# Patient Record
Sex: FEMALE | Race: WHITE | Employment: FULL TIME | URBAN - METROPOLITAN AREA
[De-identification: names, ages, dates, MRNs, and addresses within clinical notes are randomized per-mention and may not be internally consistent; named-entity substitution may affect disease eponyms.]

---

## 2019-03-25 ENCOUNTER — APPOINTMENT (OUTPATIENT)
Dept: LAB | Facility: HOSPITAL | Age: 49
End: 2019-03-25
Attending: COLON & RECTAL SURGERY
Payer: COMMERCIAL

## 2019-03-25 ENCOUNTER — OFFICE VISIT (OUTPATIENT)
Dept: SURGERY | Facility: CLINIC | Age: 49
End: 2019-03-25
Payer: COMMERCIAL

## 2019-03-25 VITALS
HEIGHT: 63 IN | HEART RATE: 59 BPM | TEMPERATURE: 99 F | BODY MASS INDEX: 51.91 KG/M2 | WEIGHT: 293 LBS | SYSTOLIC BLOOD PRESSURE: 156 MMHG | DIASTOLIC BLOOD PRESSURE: 90 MMHG

## 2019-03-25 DIAGNOSIS — Z01.818 PRE-OP TESTING: ICD-10-CM

## 2019-03-25 DIAGNOSIS — K62.9 ANAL LESION: Primary | ICD-10-CM

## 2019-03-25 DIAGNOSIS — F41.9 ANXIETY: ICD-10-CM

## 2019-03-25 DIAGNOSIS — Z80.0 FAMILY HISTORY OF COLON CANCER IN MOTHER: ICD-10-CM

## 2019-03-25 PROBLEM — E03.9 HYPOTHYROIDISM: Status: ACTIVE | Noted: 2017-10-19

## 2019-03-25 PROBLEM — I10 HYPERTENSION: Status: ACTIVE | Noted: 2018-04-03

## 2019-03-25 LAB
ALBUMIN SERPL-MCNC: 3.5 G/DL (ref 3.4–5)
ALBUMIN/GLOB SERPL: 0.8 {RATIO} (ref 1–2)
ALP LIVER SERPL-CCNC: 129 U/L (ref 39–100)
ALT SERPL-CCNC: 29 U/L (ref 13–56)
ANION GAP SERPL CALC-SCNC: 6 MMOL/L (ref 0–18)
AST SERPL-CCNC: 23 U/L (ref 15–37)
ATRIAL RATE: 59 BPM
BILIRUB SERPL-MCNC: 0.3 MG/DL (ref 0.1–2)
BUN BLD-MCNC: 15 MG/DL (ref 7–18)
BUN/CREAT SERPL: 16.7 (ref 10–20)
CALCIUM BLD-MCNC: 9.1 MG/DL (ref 8.5–10.1)
CHLORIDE SERPL-SCNC: 108 MMOL/L (ref 98–107)
CO2 SERPL-SCNC: 27 MMOL/L (ref 21–32)
CREAT BLD-MCNC: 0.9 MG/DL (ref 0.55–1.02)
GLOBULIN PLAS-MCNC: 4.2 G/DL (ref 2.8–4.4)
GLUCOSE BLD-MCNC: 108 MG/DL (ref 70–99)
M PROTEIN MFR SERPL ELPH: 7.7 G/DL (ref 6.4–8.2)
OSMOLALITY SERPL CALC.SUM OF ELEC: 293 MOSM/KG (ref 275–295)
P AXIS: 29 DEGREES
P-R INTERVAL: 148 MS
POTASSIUM SERPL-SCNC: 4.3 MMOL/L (ref 3.5–5.1)
Q-T INTERVAL: 444 MS
QRS DURATION: 72 MS
QTC CALCULATION (BEZET): 439 MS
R AXIS: 38 DEGREES
SODIUM SERPL-SCNC: 141 MMOL/L (ref 136–145)
T AXIS: 41 DEGREES
VENTRICULAR RATE: 59 BPM

## 2019-03-25 PROCEDURE — 93005 ELECTROCARDIOGRAM TRACING: CPT

## 2019-03-25 PROCEDURE — 46600 DIAGNOSTIC ANOSCOPY SPX: CPT | Performed by: COLON & RECTAL SURGERY

## 2019-03-25 PROCEDURE — 36415 COLL VENOUS BLD VENIPUNCTURE: CPT

## 2019-03-25 PROCEDURE — 80053 COMPREHEN METABOLIC PANEL: CPT

## 2019-03-25 PROCEDURE — 93010 ELECTROCARDIOGRAM REPORT: CPT | Performed by: INTERNAL MEDICINE

## 2019-03-25 PROCEDURE — 99244 OFF/OP CNSLTJ NEW/EST MOD 40: CPT | Performed by: COLON & RECTAL SURGERY

## 2019-03-25 RX ORDER — LEVOTHYROXINE SODIUM 0.05 MG/1
0.05 TABLET ORAL
COMMUNITY
Start: 2018-12-29

## 2019-03-25 NOTE — PATIENT INSTRUCTIONS
The patient presents today in consultation of Dr. Jovani Diaz for perianal skin lesions. This patient has a very complex medical history, she presents to our office for treatment of multiple suspicious skin lesions seen in the anal canal on colonoscopy.   Her medical history for hypertension, hypothyroidism, and hyperlipidemia. She does not take any blood thinners. She uses a dental device for obstructive sleep apnea.     The patient has a past surgical history significant for a partial hysterectomy due to pre the conversation and its details. Consent for surgery was confirmed with the patient. The patient should continue to follow with Dr. Shreya Cruz for her gastrointestinal conditions, diverticulitis, and future colonoscopies.   The patient may return to our

## 2019-03-25 NOTE — H&P
New Patient Visit Note       Active Problems      1. Anal lesion    2. Pre-op testing    3. Anxiety    4.  Family history of colon cancer in mother        Chief Complaint   Patient presents with:  Anal Problem (GI): NP, referred by Dr. Deshawn Pizarro (GI), anal miya her position for these bowel movements. She denies having any associated fevers, chills, nausea, or vomiting. The patient also has a significant past medical history of multiple episodes of diverticulitis.   Her first episode was 15 years ago and most r of this note as detailed above    Gaston Morton MD FACS FASCR[employer]    My total face time with this patient was 30 minutes. Greater than half of our visit was spent in counseling the patient on the above listed diagnoses and treatment options.       Allergie mouth., Disp: , Rfl:   •  Multiple Vitamins-Minerals (MULTIVITAMIN ADULT OR), Take by mouth., Disp: , Rfl:       Review of Systems  The Review of Systems has been reviewed by me during today.   Review of Systems   Constitutional: Negative for chills, diapho rales.   Abdominal: Soft. Normal appearance, normal aorta and bowel sounds are normal. She exhibits no distension, no fluid wave, no abdominal bruit, no ascites, no pulsatile midline mass and no mass. There is no splenomegaly or hepatomegaly.  There is no t time.   Skin: Skin is warm and dry. Psychiatric: She has a normal mood and affect. Her speech is normal and behavior is normal. Judgment and thought content normal. Cognition and memory are normal.   Nursing note and vitals reviewed.           Assessment position for these bowel movements. She denies having any associated fevers, chills, nausea, or vomiting. The patient also has a significant past medical history of multiple episodes of diverticulitis.   Her first episode was 15 years ago and most recen and the anal [facility] at [facility] in [address] [place] [address] on March 29, 2019. All risks, benefits, complications and alternatives to the proposed operation were fully discussed with the patient.  All questions from the patient were answered in

## 2019-03-29 ENCOUNTER — LAB REQUISITION (OUTPATIENT)
Dept: LAB | Facility: HOSPITAL | Age: 49
End: 2019-03-29
Payer: COMMERCIAL

## 2019-03-29 DIAGNOSIS — C44.500 MALIGNANT NEOPLASM OF ANAL SKIN: ICD-10-CM

## 2019-03-29 PROCEDURE — 88305 TISSUE EXAM BY PATHOLOGIST: CPT | Performed by: COLON & RECTAL SURGERY

## 2019-04-08 ENCOUNTER — OFFICE VISIT (OUTPATIENT)
Dept: SURGERY | Facility: CLINIC | Age: 49
End: 2019-04-08

## 2019-04-08 VITALS
HEART RATE: 64 BPM | BODY MASS INDEX: 51.91 KG/M2 | WEIGHT: 293 LBS | HEIGHT: 63 IN | SYSTOLIC BLOOD PRESSURE: 147 MMHG | TEMPERATURE: 98 F | DIASTOLIC BLOOD PRESSURE: 94 MMHG

## 2019-04-08 DIAGNOSIS — A63.0 ANAL WART: ICD-10-CM

## 2019-04-08 DIAGNOSIS — K62.9 ANAL LESION: Primary | ICD-10-CM

## 2019-04-08 PROCEDURE — 99024 POSTOP FOLLOW-UP VISIT: CPT | Performed by: COLON & RECTAL SURGERY

## 2019-04-08 RX ORDER — HYDROCODONE BITARTRATE AND ACETAMINOPHEN 5; 325 MG/1; MG/1
TABLET ORAL
Refills: 0 | COMMUNITY
Start: 2019-03-29 | End: 2019-06-24

## 2019-04-08 NOTE — PATIENT INSTRUCTIONS
The patient presents today for continued care and treatment of a transanal excision of an anal canal polyp, excision and fulguration of multiple anal margin and anal mucosal skin lesions performed on March 29, 2019.     The patient states that since having

## 2019-04-08 NOTE — PROGRESS NOTES
Post Operative Visit Note       Active Problems  1. Anal lesion    2.  Anal wart         Chief Complaint   Patient presents with:  Post-Op: 3/29 transanal excision of anal canal polyp, excision and fulguration of multiple anal margin and anal mucosal skin l • Essential hypertension    • High cholesterol    • History of stomach ulcers    • Hypothyroidism      Past Surgical History:   Procedure Laterality Date   • APPENDECTOMY  1979   • CHOLECYSTECTOMY  2004   • COLONOSCOPY  03/04/2019   • HYSTERECTOMY  2016 apnea, cough, shortness of breath and wheezing. Cardiovascular: Negative for chest pain, palpitations and leg swelling. Gastrointestinal: Positive for rectal pain.  Negative for abdominal distention, abdominal pain, anal bleeding, blood in stool, const confirmed negative in the ventral area. Genitourinary: Vagina normal. Rectal exam shows no external hemorrhoid, no internal hemorrhoid, no fissure, no mass, no tenderness and anal tone normal. Pelvic exam was performed with patient prone.  No vaginal disc anal mucosal skin lesions performed on March 29, 2019. The patient states that since having the procedure she is doing well overall. She states that she is only requiring ibuprofen for pain at this time.   She states that she continues to have small sandra

## 2019-04-29 ENCOUNTER — OFFICE VISIT (OUTPATIENT)
Dept: SURGERY | Facility: CLINIC | Age: 49
End: 2019-04-29

## 2019-04-29 VITALS
HEART RATE: 70 BPM | BODY MASS INDEX: 51.91 KG/M2 | TEMPERATURE: 98 F | HEIGHT: 63 IN | DIASTOLIC BLOOD PRESSURE: 92 MMHG | WEIGHT: 293 LBS | SYSTOLIC BLOOD PRESSURE: 152 MMHG

## 2019-04-29 DIAGNOSIS — A63.0 ANAL WART: ICD-10-CM

## 2019-04-29 DIAGNOSIS — K62.9 ANAL LESION: Primary | ICD-10-CM

## 2019-04-29 PROCEDURE — 99024 POSTOP FOLLOW-UP VISIT: CPT | Performed by: COLON & RECTAL SURGERY

## 2019-04-29 RX ORDER — RANITIDINE 150 MG/1
150 TABLET ORAL
COMMUNITY

## 2019-04-29 NOTE — PROGRESS NOTES
Follow Up Visit Note       Active Problems      No diagnosis found. Chief Complaint   Patient presents with:  Anal / Rectal Problem: 3 week follow up--P/O -3/29 EXCISION OF MULTIPLE SKIN LESIONS OF ANAL MARGIN AND MUCOUSAL LESIONS ANAL CANAL.  Pt nikki 0  •  temazepam 15 MG Oral Cap, TK ONE C PO QHS PRF SLEEPLESSNESS, Disp: , Rfl: 2  •  Levothyroxine Sodium 50 MCG Oral Tab, 0.05 tablets. , Disp: , Rfl:   •  Lactobacillus (PROBIOTIC ACIDOPHILUS) Oral Cap, Take by mouth., Disp: , Rfl:   •  Multiple Vitamins

## 2019-04-29 NOTE — PROGRESS NOTES
Post Operative Visit Note       Active Problems  1. Anal lesion    2.  Anal wart         Chief Complaint   Patient presents with:  Anal / Rectal Problem: 3 week follow up--P/O -3/29 EXCISION OF MULTIPLE SKIN LESIONS OF ANAL MARGIN AND MUCOUSAL LESIONS ANAL DINNER, Disp: , Rfl: 0  •  temazepam 15 MG Oral Cap, TK ONE C PO QHS PRF SLEEPLESSNESS, Disp: , Rfl: 2  •  Levothyroxine Sodium 50 MCG Oral Tab, 0.05 tablets. , Disp: , Rfl:   •  Lactobacillus (PROBIOTIC ACIDOPHILUS) Oral Cap, Take by mouth., Disp: , Rfl: other sites are well-healed and closed. The region is nontender. Digital exam reveals smooth mucosa with no palpable recurrence. We will defer anoscopy to her next visit. I will see this patient again in 6 weeks for further care and treatment.

## 2019-04-29 NOTE — PATIENT INSTRUCTIONS
This patient presents for follow-up treatment and care regarding excision of suspicious lesions of the anal margin and anal canal.  All lesions on final pathology are wart type lesions. The patient has no complaints at the operative sites.   She is heali

## 2019-06-24 ENCOUNTER — OFFICE VISIT (OUTPATIENT)
Dept: SURGERY | Facility: CLINIC | Age: 49
End: 2019-06-24
Payer: COMMERCIAL

## 2019-06-24 VITALS
BODY MASS INDEX: 51.91 KG/M2 | SYSTOLIC BLOOD PRESSURE: 140 MMHG | HEIGHT: 63 IN | WEIGHT: 293 LBS | HEART RATE: 54 BPM | DIASTOLIC BLOOD PRESSURE: 91 MMHG | TEMPERATURE: 98 F

## 2019-06-24 DIAGNOSIS — A63.0 ANAL WART: Primary | ICD-10-CM

## 2019-06-24 PROCEDURE — 46600 DIAGNOSTIC ANOSCOPY SPX: CPT | Performed by: COLON & RECTAL SURGERY

## 2019-06-24 NOTE — PROGRESS NOTES
Visit Note       Active Problems  1.  Anal wart         Chief Complaint   Anal condyloma       History of Present Illness   Liu Ambrose is a 51year old female underwent a transanal excision of anal canal polyp, excision and fulguration of multiple anal 2004   • COLONOSCOPY  03/04/2019   • HYSTERECTOMY  2016   • OTHER SURGICAL HISTORY         The family history and social history have been reviewed by me today. No family history on file. Social History    Socioeconomic History      Marital status:  Vanesa Esteves color change and rash. Neurological: Negative for tremors, syncope and weakness. Hematological: Negative for adenopathy. Does not bruise/bleed easily. Psychiatric/Behavioral: Negative for behavioral problems and sleep disturbance.        Physical Find the transverse colon revealed a sessile serrated adenoma. There was an anal canal polyp that pathology revealed to be an anal squamous papilloma. The P16 and Ki-67 immunostrains are negative for high grade dysplasia. The patient denies any pain.  She re

## 2019-06-24 NOTE — PROCEDURES
Procedure:  Anoscopy    Surgeon: Abby Matamoros    Anesthesia: None    Findings: See the progress note attached for all findings    Operative Summary: The patient was placed in a prone position on the proctoscopy table, the hips were flexed in the jackknife p

## 2019-06-24 NOTE — PATIENT INSTRUCTIONS
Willis Barone is a 51year old female underwent a transanal excision of anal canal polyp, excision and fulguration of multiple anal margin and anal mucosal skin lesions on March 29, 2019.  Final pathology revealed the lesion at the anal margin to be a Fibr

## 2019-10-07 ENCOUNTER — OFFICE VISIT (OUTPATIENT)
Dept: SURGERY | Facility: CLINIC | Age: 49
End: 2019-10-07
Payer: COMMERCIAL

## 2019-10-07 VITALS
HEART RATE: 83 BPM | SYSTOLIC BLOOD PRESSURE: 148 MMHG | BODY MASS INDEX: 51.91 KG/M2 | TEMPERATURE: 98 F | WEIGHT: 293 LBS | DIASTOLIC BLOOD PRESSURE: 88 MMHG | HEIGHT: 63 IN

## 2019-10-07 DIAGNOSIS — F41.9 ANXIETY: ICD-10-CM

## 2019-10-07 DIAGNOSIS — K62.9 ANAL LESION: Primary | ICD-10-CM

## 2019-10-07 DIAGNOSIS — A63.0 ANAL WART: ICD-10-CM

## 2019-10-07 PROCEDURE — 46600 DIAGNOSTIC ANOSCOPY SPX: CPT | Performed by: COLON & RECTAL SURGERY

## 2019-10-07 PROCEDURE — 99213 OFFICE O/P EST LOW 20 MIN: CPT | Performed by: COLON & RECTAL SURGERY

## 2019-10-07 RX ORDER — NICOTINE POLACRILEX 2 MG
GUM BUCCAL
COMMUNITY

## 2019-10-07 RX ORDER — TEMAZEPAM 15 MG/1
CAPSULE ORAL
COMMUNITY
Start: 2019-09-25

## 2019-10-07 NOTE — PATIENT INSTRUCTIONS
Liu Ambrose is a 51year old female underwent a transanal excision of anal canal polyp, excision and fulguration of multiple anal margin and anal mucosal skin lesions on March 29, 2019.  Final pathology revealed the lesion at the anal margin to be a Fibr

## 2019-10-07 NOTE — PROGRESS NOTES
Follow Up Visit Note       Active Problems      1. Anal lesion    2. Anxiety          Chief Complaint   Patient presents with:  Anal / Rectal Problem: Cont Care 3 monthEXCISION OF MULTIPLE SKIN LESIONS OF ANAL MARGIN AND MUCOUSAL . PT. OVERALL FEELS WELL. the above listed diagnoses and treatment options. Allergies  Lizet Aguilar is allergic to bees and macrobid [nitrofurantoin]. Past Medical / Surgical / Social / Family History    The past medical and past surgical history have been reviewed by me today.     P Review of Systems has been reviewed by me during today. Review of Systems   Constitutional: Negative for chills, diaphoresis, fatigue, fever and unexpected weight change. HENT: Negative for hearing loss, nosebleeds, sore throat and trouble swallowing. bruit, no ascites, no pulsatile midline mass and no mass. There is no splenomegaly or hepatomegaly. There is no tenderness. There is no rigidity, no rebound, no guarding, no CVA tenderness, no tenderness at McBurney's point and negative Gonzales's sign.  No h and memory are normal.   Nursing note and vitals reviewed.        Assessment   Anal lesion  (primary encounter diagnosis)  Anxiety    Plan   Meggan Rubi is a 51year old female underwent a transanal excision of anal canal polyp, excision and fulguration

## 2019-10-09 NOTE — PROCEDURES
Procedure:  Anoscopy    Surgeon: Chantel Aguilar    Anesthesia: None    Findings: See the progress note attached for all findings    Operative Summary: The patient was placed in a prone position on the proctoscopy table, the hips were flexed in the jackknife p

## 2020-01-30 ENCOUNTER — OFFICE VISIT (OUTPATIENT)
Dept: SURGERY | Facility: CLINIC | Age: 50
End: 2020-01-30
Payer: COMMERCIAL

## 2020-01-30 VITALS
DIASTOLIC BLOOD PRESSURE: 80 MMHG | WEIGHT: 293 LBS | BODY MASS INDEX: 55 KG/M2 | TEMPERATURE: 98 F | SYSTOLIC BLOOD PRESSURE: 122 MMHG

## 2020-01-30 DIAGNOSIS — Z80.0 FAMILY HISTORY OF COLON CANCER IN MOTHER: ICD-10-CM

## 2020-01-30 DIAGNOSIS — K64.2 PROLAPSED INTERNAL HEMORRHOIDS, GRADE 3: ICD-10-CM

## 2020-01-30 DIAGNOSIS — K62.9 ANAL LESION: ICD-10-CM

## 2020-01-30 DIAGNOSIS — A63.0 ANAL WART: Primary | ICD-10-CM

## 2020-01-30 PROCEDURE — 99213 OFFICE O/P EST LOW 20 MIN: CPT | Performed by: COLON & RECTAL SURGERY

## 2020-01-30 NOTE — PROCEDURES
Procedure:  Anoscopy    Surgeon: Sanna Juares    Anesthesia: None    Findings: See the progress note attached for all findings    Operative Summary: The patient was placed in a prone position on the proctoscopy table, the hips were flexed in the jackknife p

## 2020-01-30 NOTE — PROGRESS NOTES
Follow Up Visit Note       Active Problems      1. Anal wart    2. Anal lesion    3. Prolapsed internal hemorrhoids, grade 3    4.  Family history of colon cancer in mother          Chief Complaint   Patient presents with:  Anal Problem:  3 month cont care- this note as detailed above    Abbey Peabody, MD FACS FASCRS    My total face time with this patient was 30 minutes. Greater than half of our visit was spent in counseling the patient on the above listed diagnoses and treatment options.       Allergies Lactobacillus (PROBIOTIC ACIDOPHILUS) Oral Cap, Take by mouth., Disp: , Rfl:   •  Multiple Vitamins-Minerals (MULTIVITAMIN ADULT OR), Take by mouth., Disp: , Rfl:      Review of Systems  The Review of Systems has been reviewed by me during today.   Review o has no rhonchi. She has no rales. Abdominal: Soft. Normal appearance, normal aorta and bowel sounds are normal. She exhibits no distension, no fluid wave, no abdominal bruit, no ascites, no pulsatile midline mass and no mass.  There is no splenomegaly or person, place, and time. Skin: Skin is warm and dry. Psychiatric: She has a normal mood and affect. Her speech is normal and behavior is normal. Judgment and thought content normal. Cognition and memory are normal.   Nursing note and vitals reviewed. anal lesions at this time. I will see the patient on an as-needed basis for her anal condyloma. She will continue to follow with Dr. Pippa Figueroa for her colonoscopies. The patient should contact my office for any new lesions or condyloma.      No orders of th

## (undated) NOTE — LETTER
10/07/19    Patient: Kacy Barahona  : 10/24/1970 Visit date: 10/7/2019    Dear  Dr. Steven Springer,    Thank you for referring Kacy Barahona to my practice. Please find my assessment and plan below.       Assessment   Anal lesion  (primary encounter diagnosis

## (undated) NOTE — LETTER
19    Patient: Lloyd Batista  : 10/24/1970 Visit date: 3/25/2019    Dear  Dr. Sarahy Ardon,    Thank you for referring Lloyd Batista to my practice. Please find my assessment and plan below.                 Assessment   Pre-op testing  (primary encoun bowel movements. She denies having any associated fevers, chills, nausea, or vomiting. The patient also has a significant past medical history of multiple episodes of diverticulitis.   Her first episode was 15 years ago and most recent episode was 4 yea of the anal margin and the anal canal at the [facility] in [facility] on March 29, 2019. All risks, benefits, complications and alternatives to the proposed operation were fully discussed with the patient.  All questions from the patien

## (undated) NOTE — LETTER
20    Patient: Brynn Guerrero  : 10/24/1970 Visit date: 2020    Dear  Dr. Latha Washington,    Thank you for referring Brynn Guerrero to my practice. Please find my assessment and plan below.     Assessment   Anal wart  (primary encounter diagnosis)  A

## (undated) NOTE — LETTER
19    Patient: Alonso Suggs  : 10/24/1970 Visit date: 2019    Dear  Dr. Sheri Herrnig,    Thank you for referring Alonso Suggs to my practice. Please find my assessment and plan below.              Assessment   Anal wart  (primary encounter diag

## (undated) NOTE — LETTER
19    Patient: Edward Blackman  : 10/24/1970 Visit date: 2019    Dear  Dr. Ludin Carey,    Thank you for referring Edward Blackman to my practice. Please find my assessment and plan below.            Pathology:  Final Diagnosis:   A.  Skin lesion, an for evaluation of the warts to ensure that they do not recur and to treat them promptly with any recurrence.     It was also discussed with the patient at this time that since she does have the history of colon polyps, I recommend that she follow-up with

## (undated) NOTE — LETTER
19    Patient: Bogdan Lester  : 10/24/1970 Visit date: 2019    Dear  Dr. Junito Quintana,    Thank you for referring Bogdan Lester to my practice. Please find my assessment and plan below.       Assessment   Anal lesion  (primary encounter diagnosis